# Patient Record
Sex: FEMALE | Race: OTHER | HISPANIC OR LATINO | URBAN - METROPOLITAN AREA
[De-identification: names, ages, dates, MRNs, and addresses within clinical notes are randomized per-mention and may not be internally consistent; named-entity substitution may affect disease eponyms.]

---

## 2024-08-31 ENCOUNTER — EMERGENCY (EMERGENCY)
Facility: HOSPITAL | Age: 64
LOS: 1 days | Discharge: HOME | End: 2024-08-31
Attending: EMERGENCY MEDICINE
Payer: MEDICAID

## 2024-08-31 VITALS
TEMPERATURE: 98 F | WEIGHT: 97 LBS | DIASTOLIC BLOOD PRESSURE: 73 MMHG | OXYGEN SATURATION: 95 % | RESPIRATION RATE: 18 BRPM | SYSTOLIC BLOOD PRESSURE: 123 MMHG | HEART RATE: 73 BPM

## 2024-08-31 VITALS
DIASTOLIC BLOOD PRESSURE: 78 MMHG | OXYGEN SATURATION: 96 % | SYSTOLIC BLOOD PRESSURE: 128 MMHG | HEART RATE: 70 BPM | TEMPERATURE: 97 F | RESPIRATION RATE: 18 BRPM

## 2024-08-31 DIAGNOSIS — Z98.891 HISTORY OF UTERINE SCAR FROM PREVIOUS SURGERY: Chronic | ICD-10-CM

## 2024-08-31 PROCEDURE — 71250 CT THORAX DX C-: CPT | Mod: MC

## 2024-08-31 PROCEDURE — 96372 THER/PROPH/DIAG INJ SC/IM: CPT

## 2024-08-31 PROCEDURE — 99284 EMERGENCY DEPT VISIT MOD MDM: CPT | Mod: 25

## 2024-08-31 PROCEDURE — 99284 EMERGENCY DEPT VISIT MOD MDM: CPT

## 2024-08-31 PROCEDURE — 71250 CT THORAX DX C-: CPT | Mod: 26,MC

## 2024-08-31 RX ORDER — METHOCARBAMOL 750 MG/1
2 TABLET, FILM COATED ORAL
Qty: 18 | Refills: 0
Start: 2024-08-31 | End: 2024-09-02

## 2024-08-31 RX ORDER — IBUPROFEN 600 MG
1 TABLET ORAL
Qty: 28 | Refills: 0
Start: 2024-08-31 | End: 2024-09-06

## 2024-08-31 RX ORDER — KETOROLAC TROMETHAMINE 30 MG/ML
30 INJECTION, SOLUTION INTRAMUSCULAR ONCE
Refills: 0 | Status: DISCONTINUED | OUTPATIENT
Start: 2024-08-31 | End: 2024-08-31

## 2024-08-31 RX ORDER — LIDOCAINE/BENZALKONIUM/ALCOHOL
1 SOLUTION, NON-ORAL TOPICAL
Qty: 1 | Refills: 0
Start: 2024-08-31 | End: 2024-09-04

## 2024-08-31 RX ORDER — METHOCARBAMOL 750 MG/1
1500 TABLET, FILM COATED ORAL ONCE
Refills: 0 | Status: COMPLETED | OUTPATIENT
Start: 2024-08-31 | End: 2024-08-31

## 2024-08-31 RX ORDER — LIDOCAINE/BENZALKONIUM/ALCOHOL
1 SOLUTION, NON-ORAL TOPICAL ONCE
Refills: 0 | Status: COMPLETED | OUTPATIENT
Start: 2024-08-31 | End: 2024-08-31

## 2024-08-31 RX ADMIN — KETOROLAC TROMETHAMINE 30 MILLIGRAM(S): 30 INJECTION, SOLUTION INTRAMUSCULAR at 17:28

## 2024-08-31 RX ADMIN — Medication 1 PATCH: at 19:36

## 2024-08-31 RX ADMIN — Medication 1 PATCH: at 16:58

## 2024-08-31 RX ADMIN — METHOCARBAMOL 1500 MILLIGRAM(S): 750 TABLET, FILM COATED ORAL at 16:58

## 2024-08-31 RX ADMIN — KETOROLAC TROMETHAMINE 30 MILLIGRAM(S): 30 INJECTION, SOLUTION INTRAMUSCULAR at 16:58

## 2024-08-31 NOTE — ED PROVIDER NOTE - OBJECTIVE STATEMENT
#[id number]: 64-year-old female with no past medical history presents with bilateral mid back pain for 3 months.  Patient reports that in the last 3 weeks it is worsened.  Saw her primary care doctor who gave her cyclobenzaprine.  Patient reports no relief.  Patient reports the pain radiates from her mid back up to her neck causing occipital headaches.  Patient reports intermittent right lower extremity numbness. Denies LE tingling, weakness, saddle anesthesia, fever, chills, IVDU, hx of cancer, bowel or bladder incontinence or any other concerning features.

## 2024-08-31 NOTE — ED ADULT NURSE NOTE - DISCHARGE DATE/TIME
08/11/21 0150   NIV Type   Equipment Type system one   Mode CPAP   Mask Type Full face mask   Mask Size Large   Settings/Measurements   CPAP/EPAP 18 cmH2O   Resp 18   O2 Flow Rate (L/min) 3 L/min   Comfort Level Good   Using Accessory Muscles No   SpO2 91 31-Aug-2024 20:48

## 2024-08-31 NOTE — ED PROVIDER NOTE - PROGRESS NOTE DETAILS
CT with degenerative changes. Will have patient follow up with spine, pain management and PCP. Pt is well appearing walking with steady gait, stable for discharge and follow up without fail with medical doctor. I had a detailed discussion with the patient and/or guardian regarding the historical points, exam findings, and any diagnostic results supporting the discharge diagnosis. Pt educated on care and need for follow up. Strict return instructions and red flag signs and symptoms discussed with patient. Questions answered. Pt shows understanding of discharge information and agrees to follow.

## 2024-08-31 NOTE — ED PROVIDER NOTE - IV ALTEPLASE DOOR HIDDEN
Physical Therapy Visit    Visit Type: Daily Treatment Note  Visit: 9  Referring Provider: Ramón Humphreys PA-C  Medical Diagnosis (from order): Diagnosis Information    Diagnosis  854.01 (ICD-9-CM) - S06.0X0A (ICD-10-CM) - Closed head injury with concussion, without loss of consciousness, initial encounter  784.0 (ICD-9-CM) - G44.86 (ICD-10-CM) - Cervicogenic headache         SUBJECTIVE                                                                                                               Patient states that she is feeling good. Notes she has not had any headache since was last here.  Exercises are going well.  Not really getting the car sickness/motion sickness either anymore.  Functional Change: Doing all daily activities but does get sore with the exercises and the increased resistance.  Able to lift the 5-10 pound items at work without difficulty and sometimes the 20 pound pallets as well.     Pain / Symptoms  - Pain rating (out of 10): Current: 0       OBJECTIVE                                                                                                                                       Treatment     Therapeutic Exercise  Patient performed the following therapeutic exercises in the clinic as noted below:  Sitting cervical chin tuck: 0 x 5 seconds  Sitting cervical isometric right and left rotation: 0 x 5 seconds each  Sitting cervical isometric right and left sidebending: 0 x 5 seconds each  Sitting cervical isometric extension with slight chin tuck: 0 x 5 seconds  Sitting cervical isometric flexion with slight chin tuck: 0 x 5 seconds  Sitting scapular squeezes: 0 x 5 seconds  Sitting bilateral shoulder external rotation with red or green theraband 0x  Standing Scaption Slide with Wall 0x  Standing straight row red or [id number]  Standing pull downs red or green 0x  Wall push ups with hands at shoulder height: 2 x 10   Overhead ball bounce 2 x 30 seconds      Manual Therapy   Patient received  manual therapy of craniosacral therapy techniques (CST) of CV-4 stillpoint, OCB release with platform, OA gapping, condylar spread and dural tube traction, frontal lift,   sphenoid compression/decompression, parietal lift (2 parts), ear pull, finger in ear technique, dural tube traction and CV-4 stillpoint in lying supine.        Skilled input: verbal instruction/cues, posture correction and as detailed above  education/instruction on: new exercises and updated home program.  Re-evaluation at next visit with likely discharge   instruction/cues for: proper form for new exercises with scapular retraction and chin tuck    Writer verbally educated and received verbal consent for hand placement, positioning of patient, and techniques to be performed today from patient for therapist position for techniques and hand placement and palpation for techniques as described above and how they are pertinent to the patient's plan of care.    Home Exercise Program  Access Code: [email or web address]  URL: [email or web address]  Date: 12/09/2022  Prepared by: Laura Arroyo    Program Notes   Apply ice to neck for 10-15 minutes at a time.       Try to move as much as you can     Tennis balls  Ear pulls      Exercises  · Seated Cervical Rotation AROM - 2 x daily - 7 x weekly - 1-2 sets - 10 reps  · Supine Chin Tuck - 2 x daily - 7 x weekly - 1-2 sets - 10 reps - 5 seconds hold  · Seated Gaze Stabilization with Head Rotation - 2 x daily - 7 x weekly - 1 sets - 3 reps - 30 seconds duration  · Seated Gaze Stabilization with Head Nod - 2 x daily - 7 x weekly - 1 sets - 3 reps - 30 seconds duration  · Sitting or standing shoulder blade squeeze - 2 x daily - 7 x weekly - 2 sets - 10 reps  · Shoulder External Rotation and Scapular Retraction with Resistance - 1 x daily - 7 x weekly - 2 sets - 10 reps  · Standing Scaption Slide with Wall - 1 x daily - 7 x weekly - 1-2 sets - 10 reps  · Standing Row with Anchored Resistance - 1 x  daily - 7 x weekly - 2 sets - 10 reps  · Standing Lat Pull Down with Resistance - Elbows Bent - 1 x daily - 7 x weekly - 2 sets - 10 reps  · Seated Isometric Cervical Sidebending - 2 x daily - 7 x weekly - 1-2 sets - 10 reps - 5 seconds hold  · Seated Isometric Cervical Rotation - 2 x daily - 7 x weekly - 1-2 sets - 10 reps - 5 seconds hold  · Seated Isometric Cervical Extension - 2 x daily - 7 x weekly - 1-2 sets - 10 reps - 5 seconds hold  · Seated Isometric Cervical Flexion - 2 x daily - 7 x weekly - 1-2 sets - 10 reps - 5 seconds hold  · Seated Cervical Retraction - 2 x daily - 7 x weekly - 1-2 sets - 10 reps - 5 seconds hold  · Standing Overhead Ball Pass at Wall - 1 x daily - 7 x weekly - 1 sets - 2 reps - 30 seconds duration  · Wall Push Up - 1 x daily - 7 x weekly - 2 sets - 10 reps         ASSESSMENT                                                                                                            Patient continues to make excellent progress both subjectively and objectively .  Would anticipate discharge to home exercise program only at the next visit.    Pain/symptoms after session (out of 10): 0  Education:   - Results of above outlined education: Verbalizes understanding and Demonstrates understanding    PLAN                                                                                                                           Suggestions for next session as indicated: Progress per plan of care.  Re-evaluation/re-assess for likely discharge to home exercise program only        Therapy procedure time and total treatment time can be found documented on the Time Entry flowsheet     show

## 2024-08-31 NOTE — ED PROVIDER NOTE - PHYSICAL EXAMINATION
Back is symmetrical, scapulae are symmetric. Neck has full range of motion. No spinal tenderness, deformity or step-offs. All limbs equally strong 5+ bilaterally. Strong ankle dorsiflexion and plantar flexion against resistance bilaterally. Strong flexion/extension of big toe bilaterally. Pt is able to walk in straight line with steady gait. No recent weight loss or night sweats. No saddle anesthesia, no bowel/bladder incontinence or retention, no lower extremity weakness. b/l thoracic paraspinal tenderness.

## 2024-08-31 NOTE — ED ADULT NURSE NOTE - NS ED NURSE DC INFO COMPLEXITY
Doing well on meloxicam.  Off of Enbrel with no symptoms.  
No clinical signs or symptoms suggestive of MCTD despite serologies  
Simple: Patient demonstrates quick and easy understanding/Verbalized Understanding

## 2024-08-31 NOTE — ED ADULT TRIAGE NOTE - CHIEF COMPLAINT QUOTE
mid back pain x 3 months, pt on pain meds/ muscle relaxant but no improvement, denies any injury/ trauma

## 2024-08-31 NOTE — ED PROVIDER NOTE - PATIENT PORTAL LINK FT
You can access the FollowMyHealth Patient Portal offered by [facility] by registering at the following website: [email or web address]. By joining [employer]’s FollowMyHealth portal, you will also be able to view your health information using other applications (apps) compatible with our system.

## 2024-08-31 NOTE — ED PROVIDER NOTE - CLINICAL SUMMARY MEDICAL DECISION MAKING FREE TEXT BOX
#[id number]: 64-year-old female with no past medical history presents with bilateral mid back pain for 3 months.  Patient reports that in the last 3 weeks it is worsened.  Saw her primary care doctor who gave her cyclobenzaprine.  Patient reports no relief.  Patient reports the pain radiates from her mid back up to her neck causing occipital headaches.  Patient reports intermittent right lower extremity numbness. Denies LE tingling, weakness, saddle anesthesia, fever, chills, IVDU, hx of cancer, bowel or bladder incontinence or any other concerning features.    Patient with bilateral midthoracic paraspinal tenderness.  Given length of symptoms will obtain CT chest to evaluate for malignancy, disc herniation.  Will give medications for pain and reassess.

## 2024-08-31 NOTE — ED PROVIDER NOTE - NSFOLLOWUPINSTRUCTIONS_ED_ALL_ED_FT
Kevin un seguimiento con el especialista en columna (neurocirujano) dentro de las 2 semanas. Llama para concertar griselda alfa.    Nazia Owens  Cirugía de columna  [address], [address]  [place] [address]  Teléfono: [phone]     Llame a dale número para ponerse en contacto con el tratamiento del dolor: [phone]. Programará griselda alfa de telesalud con un médico especialista en tratamiento del dolor.    El [facility] ankur diagnósticos exhaustivos y planes de tratamiento personalizados para iniguez dolor específico según iniguez conveniencia, utilizando la amplia miguelito de servicios médicos y de apoyo disponibles en [facility]. Iniguez objetivo es aliviar o eliminar el dolor para mejorar la función y la calidad de mini.    Follow up with the Spine Specialist (neurosurgeon) within 2 weeks. Call to make an appointment.    Nazia Owens  Spine Surgery  [address] [place] [address]  Phone: [phone]     Call this number to get into contact with pain management: [phone]. You will make a telehealth appointment with a pain management doctor.    The [facility] provides thorough diagnoses and personalized treatment plans for your specific pain at your convenience, using the broad array of medical and support services available across [facility]. Their goal is to relieve or eliminate your pain to improve function and your quality of life.    Dolor de espalda    El dolor de espalda puede carlton miedo. Shree incluso cuando el dolor es intenso, por lo general desaparece por sí solo en unas pocas semanas. Los casos que requieren atención urgente o cirugía son raros. No asumas lo peor. Pricilla todo el julian tiene dolor de espalda en algún momento.    Consulte a iniguez médico o enfermera si tiene dolor de espalda y usted:    -Recientemente tuvo griselda caída o griselda lesión en la espalda    -Tiene entumecimiento o debilidad en las piernas    -Tiene problemas con el control de la vejiga o el intestino    -Tiene pérdida de peso inexplicable    -Tener fiebre o sentirse enfermo de otras maneras    -Rafiq medicamentos esteroides, karolina prednisona, de forma regular.    -Tiene diabetes o un problema médico que debilita iniguez sistema inmunológico    -Tener antecedentes de cáncer u osteoporosis.    También debe consultar a un médico si:    -Iniguez dolor de espalda es tan severo que no puede realizar tareas simples    -Iniguez dolor de espalda no comienza a mejorar en 4 semanas    Muchas cosas diferentes pueden causar dolor lumbar. La mayoría de las veces, los médicos no conocen la causa exacta.    El dolor de espalda puede ocurrir si se esfuerza un músculo. [place] es a menudo lo que patrick sucedido cuando griselda persona "se khurram" la espalda. [place] se refiere al dolor que comienza repentinamente después de la actividad física, karolina levantar algo pesado o doblar la espalda.    El dolor de espalda también puede ocurrir si tiene:    -Discos dañados, abultados o rotos    -Artritis que afecta las articulaciones de la columna vertebral    - Crecimientos óseos en las vértebras que aprietan los nervios cercanos    -Griselda vértebra fuera de lugar    -Estrechamiento en el canal barrios    -Un tumor o infección (shree esto es muy raro)    La mayoría de las personas con un episodio de dolor lumbar no tienen un problema médico grave y pueden probar tratamientos simples karolina:    -Mantenerse activo: lo mejor que puede hacer es mantenerse lo más activo posible. Las personas con dolor lumbar se recuperan más rápido si se mantienen activas. Si iniguez dolor es intenso, es posible que deba descansar tahmina mumtaz o dos días. Shree es importante volver a caminar y moverse lo antes posible. Si itzel debe evitar el levantamiento de objetos pesados ??y los deportes mientras le duele la espalda, intente continuar con fareed actividades diarias normales.    -Calor: a algunas personas les resulta útil usar griselda almohadilla térmica o griselda envoltura térmica. Tenga cuidado de evitar los ajustes de calor alto para evitar quemaduras en la piel.    -Medicamentos: mahad, puede probar analgésicos que puede obtener sin receta médica. En muchos casos, los médicos sugieren probar mahad un medicamento antiinflamatorio no esteroideo o "ANGELICA". Los ANGELICA incluyen ibuprofeno (marcas de muestra: Advil, Motrin) y naproxeno (marcas de muestra: Aleve). Estos podrían funcionar mejor que el paracetamol (Tylenol) para el dolor de espalda.    -Tratamientos para ayudar con los síntomas: algunos tratamientos pueden ayudarlo a sentirse mejor por un tiempo. Incluyen:    -Manipulación de la columna: esto es cuando un quiropráctico, fisioterapeuta u otro profesional mueve o "ajusta" las articulaciones de la espalda. Si quiere probar esto, hable mahad con iniguez médico o enfermera.    -Acupuntura: esto es cuando alguien que conoce la medicina tradicional [place] inserta pequeñas agujas en iniguez cuerpo para bloquear las señales de dolor.    -Masaje    Si itzel el dolor de espalda generalmente desaparece en unas pocas semanas, algunas personas continúan teniendo dolor por más tiempo. En dale micha, los tratamientos adicionales pueden incluir:    -Cuidado propio: esto implica ser consciente de iniguez dolor. Si itzel debe descansar cuando lo necesite, es importante mantenerse activo tanto karolina pueda. Cosas karolina aplicar calor y hacer estiramientos suaves también pueden ayudarlo a sentirse mejor.    -Fisioterapia: un fisioterapeuta es un experto en ejercicios que puede enseñarle estiramientos y movimientos para ayudarlo a fortalecer fareed músculos. El objetivo es aliviar el dolor shree también ayudarlo a volver a fareed actividades normales. Los ejercicios que puede probar incluyen caminar, nadar o usar griselda bicicleta estática. Algunas personas también encuentran que el [place] o el yoga pueden ayudar con el dolor de espalda. Encontrar actividades que disfrute puede ayudarlo a mantenerse activo.    -Reducir el estrés: a algunas personas les resulta útil probar algo llamado "reducción del estrés basada en la atención plena". [place] implica asistir a un programa grupal para practicar la relajación y la meditación. Si iniguez dolor de espalda lo hace sentir ansioso o deprimido, hable con iniguez médico o enfermera. Existen otros tratamientos que pueden ayudar con estos problemas.

## 2024-10-03 ENCOUNTER — EMERGENCY (EMERGENCY)
Facility: HOSPITAL | Age: 64
LOS: 1 days | Discharge: LEFT BEFORE DISCHARGE | End: 2024-10-03
Attending: STUDENT IN AN ORGANIZED HEALTH CARE EDUCATION/TRAINING PROGRAM
Payer: MEDICAID

## 2024-10-03 VITALS
HEART RATE: 84 BPM | OXYGEN SATURATION: 98 % | RESPIRATION RATE: 16 BRPM | TEMPERATURE: 98 F | DIASTOLIC BLOOD PRESSURE: 74 MMHG | WEIGHT: 97 LBS | SYSTOLIC BLOOD PRESSURE: 148 MMHG

## 2024-10-03 DIAGNOSIS — Z98.891 HISTORY OF UTERINE SCAR FROM PREVIOUS SURGERY: Chronic | ICD-10-CM

## 2024-10-03 PROCEDURE — 99284 EMERGENCY DEPT VISIT MOD MDM: CPT

## 2024-10-03 PROCEDURE — 99282 EMERGENCY DEPT VISIT SF MDM: CPT

## 2024-10-03 RX ORDER — ACETAMINOPHEN 325 MG
650 TABLET ORAL ONCE
Refills: 0 | Status: COMPLETED | OUTPATIENT
Start: 2024-10-03 | End: 2024-10-03

## 2024-10-03 RX ORDER — LIDOCAINE HCL 20 MG/ML
10 AMPUL (ML) INJECTION ONCE
Refills: 0 | Status: DISCONTINUED | OUTPATIENT
Start: 2024-10-03 | End: 2024-10-07

## 2024-10-03 RX ADMIN — Medication 650 MILLIGRAM(S): at 18:50

## 2024-10-03 NOTE — ED PROVIDER NOTE - OBJECTIVE STATEMENT
64 female presents for left second digit laceration sustained while cutting food in her kitchen.  Patient reports mild bleeding and pain but no other injuries.      GENERAL APPEARANCE:  AAOx3, generally well-appearing, no acute distress. Appears stated age.  HEENT:  NCAT. Moist mucous membranes. EOMI, clear conjunctiva, oropharynx clear.  NECK:  Supple without lymphadenopathy. No JVD.  No neck stiffness or restricted ROM.  HEART:  Normal heart rate and regular rhythm. No murmur.   LUNGS:  CTAB, moving air well. No crackles or wheezes are heard.  ABDOMEN:  Soft, nontender, non-distended. Negative Jasmine. Negative McBurney. No rebound or guarding.  CHEST/BACK: Chest wall non-tender. No CVAT, or midline cervical/thoracic/lumbar tenderness to palpation. No obvious deformity of chest wall or back.  EXTREMITIES:  Without cyanosis, clubbing or edema. Pulse intact x 4. FROM x4. Compartments soft in all extremities.  NEUROLOGICAL:  Grossly non-focal. Alert and oriented, moving all 4 extremities. CN II-XII grossly intact. Observed to ambulate with normal gait.  SKIN:  Warm and dry without any rash.  PSYCH: Calm, cooperative. Normal mood and affect. Demonstrates proper insight and judgement.  +Linear laceration horizontally oriented across left second PIP.

## 2024-10-03 NOTE — ED PROVIDER NOTE - CLINICAL SUMMARY MEDICAL DECISION MAKING FREE TEXT BOX
Area with linear laceration across soft tissue through adipose without exposure of muscle belly or tendon. No overt foreign body. Area hemostatic. Neurovascular exam congruent with above.     Patient eloped prior to repair.

## 2024-12-13 ENCOUNTER — APPOINTMENT (OUTPATIENT)
Age: 64
End: 2024-12-13
Payer: MEDICAID

## 2024-12-13 ENCOUNTER — NON-APPOINTMENT (OUTPATIENT)
Age: 64
End: 2024-12-13

## 2024-12-13 VITALS
HEIGHT: 60 IN | BODY MASS INDEX: 18.93 KG/M2 | WEIGHT: 96.4 LBS | OXYGEN SATURATION: 96 % | DIASTOLIC BLOOD PRESSURE: 70 MMHG | SYSTOLIC BLOOD PRESSURE: 121 MMHG | HEART RATE: 73 BPM

## 2024-12-13 DIAGNOSIS — Z86.39 PERSONAL HISTORY OF OTHER ENDOCRINE, NUTRITIONAL AND METABOLIC DISEASE: ICD-10-CM

## 2024-12-13 DIAGNOSIS — M54.6 PAIN IN THORACIC SPINE: ICD-10-CM

## 2024-12-13 DIAGNOSIS — M54.50 LOW BACK PAIN, UNSPECIFIED: ICD-10-CM

## 2024-12-13 DIAGNOSIS — M54.2 CERVICALGIA: ICD-10-CM

## 2024-12-13 PROCEDURE — 72110 X-RAY EXAM L-2 SPINE 4/>VWS: CPT

## 2024-12-13 PROCEDURE — 72050 X-RAY EXAM NECK SPINE 4/5VWS: CPT

## 2024-12-13 PROCEDURE — 99204 OFFICE O/P NEW MOD 45 MIN: CPT | Mod: 25

## 2024-12-13 RX ORDER — DICLOFENAC SODIUM 10 MG/G
1 GEL TOPICAL DAILY
Qty: 1 | Refills: 1 | Status: ACTIVE | COMMUNITY
Start: 2024-12-13 | End: 1900-01-01

## 2024-12-13 RX ORDER — TIZANIDINE 2 MG/1
2 TABLET ORAL EVERY 6 HOURS
Qty: 56 | Refills: 1 | Status: ACTIVE | COMMUNITY
Start: 2024-12-13 | End: 1900-01-01

## 2024-12-13 RX ORDER — MELOXICAM 7.5 MG/1
7.5 TABLET ORAL
Qty: 28 | Refills: 1 | Status: ACTIVE | COMMUNITY
Start: 2024-12-13 | End: 1900-01-01

## 2025-02-07 ENCOUNTER — APPOINTMENT (OUTPATIENT)
Age: 65
End: 2025-02-07

## 2025-07-18 ENCOUNTER — APPOINTMENT (OUTPATIENT)
Dept: ORTHOPEDIC SURGERY | Facility: CLINIC | Age: 65
End: 2025-07-18
Payer: MEDICAID

## 2025-07-18 PROCEDURE — 99214 OFFICE O/P EST MOD 30 MIN: CPT

## 2025-07-18 RX ORDER — DICLOFENAC SODIUM 10 MG/G
1 GEL TOPICAL DAILY
Qty: 1 | Refills: 2 | Status: ACTIVE | COMMUNITY
Start: 2025-07-18 | End: 1900-01-01

## 2025-07-18 RX ORDER — MELOXICAM 7.5 MG/1
7.5 TABLET ORAL
Qty: 28 | Refills: 1 | Status: ACTIVE | COMMUNITY
Start: 2025-07-18 | End: 1900-01-01

## 2025-09-12 ENCOUNTER — APPOINTMENT (OUTPATIENT)
Age: 65
End: 2025-09-12
Payer: MEDICAID

## 2025-09-12 DIAGNOSIS — M54.2 CERVICALGIA: ICD-10-CM

## 2025-09-12 DIAGNOSIS — M54.6 PAIN IN THORACIC SPINE: ICD-10-CM

## 2025-09-12 PROCEDURE — 99213 OFFICE O/P EST LOW 20 MIN: CPT

## 2025-09-12 RX ORDER — MELOXICAM 7.5 MG/1
7.5 TABLET ORAL
Qty: 28 | Refills: 1 | Status: ACTIVE | COMMUNITY
Start: 2025-09-12 | End: 1900-01-01

## 2025-09-12 RX ORDER — DICLOFENAC SODIUM 10 MG/G
1 GEL TOPICAL DAILY
Qty: 1 | Refills: 2 | Status: ACTIVE | COMMUNITY
Start: 2025-09-12 | End: 1900-01-01

## 2025-09-12 RX ORDER — TIZANIDINE 2 MG/1
2 TABLET ORAL EVERY 6 HOURS
Qty: 56 | Refills: 1 | Status: ACTIVE | COMMUNITY
Start: 2025-09-12 | End: 1900-01-01